# Patient Record
Sex: MALE | Race: WHITE
[De-identification: names, ages, dates, MRNs, and addresses within clinical notes are randomized per-mention and may not be internally consistent; named-entity substitution may affect disease eponyms.]

---

## 2018-08-19 ENCOUNTER — HOSPITAL ENCOUNTER (EMERGENCY)
Dept: HOSPITAL 25 - UCEAST | Age: 53
Discharge: HOME | End: 2018-08-19
Payer: COMMERCIAL

## 2018-08-19 VITALS — DIASTOLIC BLOOD PRESSURE: 82 MMHG | SYSTOLIC BLOOD PRESSURE: 128 MMHG

## 2018-08-19 DIAGNOSIS — R06.9: Primary | ICD-10-CM

## 2018-08-19 PROCEDURE — 99212 OFFICE O/P EST SF 10 MIN: CPT

## 2018-08-19 PROCEDURE — 87651 STREP A DNA AMP PROBE: CPT

## 2018-08-19 PROCEDURE — G0463 HOSPITAL OUTPT CLINIC VISIT: HCPCS

## 2018-08-19 NOTE — UC
Throat Pain/Nasal Castillo HPI





- HPI Summary


HPI Summary: 





53 y/o male presents to the urgent care c/o  sore throat, cough and chest 

congestion for the past 5 days. Pt reports nasal congestion w/ clear nasal 

discharge and a dry cough and +PND. Pain w/ swallowing is 8/10. Pt has taken 

Tylenol PO to alleviate symptoms. Pt denies fever, SOB, chest pain,abdominal 

pain, N/V/D.





- History of Current Complaint


Chief Complaint: UCRespiratory


Stated Complaint: SORE THROAT CONGESTION


Time Seen by Provider: 08/19/18 14:21


Hx Obtained From: Patient


Onset/Duration: Gradual Onset, Lasting Days - 5 days, Still Present, Worse 

Since - yesterday


Severity: Moderate


Pain Intensity: 8 - sore throat


Pain Scale Used: 0-10 Numeric


Cough: Nonproductive


Associated Signs & Symptoms: Positive: Dysphagia, Nasal Discharge - clear





- Epiglottits Risk Factors


Epiglottis Risk Factors: Negative





- Allergies/Home Medications


Allergies/Adverse Reactions: 


 Allergies











Allergy/AdvReac Type Severity Reaction Status Date / Time


 


No Known Allergies Allergy   Verified 08/19/18 13:54














PMH/Surg Hx/FS Hx/Imm Hx


Previously Healthy: Yes - Pt denies PMHX





- Surgical History


Surgical History: None





- Family History


Known Family History: Positive: Cardiac Disease, Diabetes





- Social History


Occupation: Employed Full-time


Lives: With Family


Alcohol Use: Rare


Substance Use Type: None


Smoking Status (MU): Never Smoked Tobacco





Review of Systems


Constitutional: Negative


Skin: Negative


Eyes: Negative


ENT: Sore Throat, Nasal Discharge, Sinus Congestion


Respiratory: Cough - dry


Cardiovascular: Negative


Gastrointestinal: Negative


Genitourinary: Negative


Motor: Negative


Neurovascular: Negative


Musculoskeletal: Negative


Neurological: Negative


Psychological: Negative


Is Patient Immunocompromised?: No


All Other Systems Reviewed And Are Negative: Yes





Physical Exam





- Summary


Physical Exam Summary: 





VITAL SIGNS: Reviewed.


GENERAL: Patient is a well developed and nourished male who is sitting 

comfortable in the examining table. Patient is not in any acute respiratory 

distress.


HEAD AND FACE: No signs of trauma. No ecchymosis, hematomas or skull 

depressions. No sinus tenderness.


EYES: PERRLA, EOMI x 2, No injected conjunctiva, no nystagmus. No photophobia.


EARS: Hearing grossly intact. Ear canals and tympanic membranes are within 

normal limits.


Nose: edematous and erythematous nasal mucosa w/ clear nasal discharge.


MOUTH: Positive no erythema, no tonsillar enlargement. Uvula in midline.


NECK: Supple, trachea is midline, Positive anterior cervical lymphadenopathy, 

no JVD, no carotid bruit, no c-spine tenderness, neck with full ROM. No 

meningeal signs, no Kernig's or brudzinskis signs.


CHEST: Symmetric, no tenderness at palpation


LUNGS: Clear to auscultation bilaterally. No wheezing or crackles.


CVS: Regular rate and rhythm, S1 and S2 present, no murmurs or gallops 

appreciated.


ABDOMEN: Soft, non-tender. No signs of distention. No rebound no guarding, and 

no masses palpated. Bowel sounds are normal.


EXTREMITIES: FROM in all major joints, no edema, no cyanosis or clubbing.


NEURO: Alert and oriented x 3. No acute neurological deficits. Speech is normal 

and follows commands.


SKIN: Dry and warm





Triage Information Reviewed: Yes


Vital Signs: 


 Initial Vital Signs











Temp  99.4 F   08/19/18 13:52


 


Pulse  94   08/19/18 13:52


 


Resp  12   08/19/18 13:52


 


BP  128/82   08/19/18 13:52


 


Pulse Ox  98   08/19/18 13:52














Throat Pain/Nasal Course/Dx





- Course


Course Of Treatment: 53 y/o male presents to the urgent care c/o  sore throat, 

cough and chest congestion for the past 5 days. Pt reports nasal congestion w/ 

clear nasal discharge and a dry cough and +PND. Pain w/ swallowing is 8/10. Pt 

has taken Tylenol PO to alleviate symptoms. Pt denies fever, SOB, chest pain,

abdominal pain, N/V/D.Hx obtained. Pt w/ UIR on examination.RApid strep 

negative. Pt advised to increase fluid intake, rest and eat well, Rx  Tessalon 

PO to alleviate symptoms. Advised to take Ibuprofen PO to alleviate symptoms. 

Pt understood and agreed with D/C instructions.





- Differential Dx/Diagnosis


Differential Diagnosis/HQI/PQRI: Influenza, Laryngitis, Pharyngitis, Sinusitis, 

Tonsillitis, URI


Provider Diagnoses: 1- Upper respiratory infection





Discharge





- Sign-Out/Discharge


Documenting (check all that apply): Patient Departure - D/C home


All imaging exams completed and their final reports reviewed: Yes





- Discharge Plan


Condition: Stable


Disposition: HOME


Prescriptions: 


Benzonatate CAP* [Tessalon 100 MG CAP*] 100 mg PO TID PRN #21 cap


 PRN Reason: Cough


Ibuprofen TAB* [Motrin TAB* 600 MG] 600 mg PO Q6H PRN #30 tab


 PRN Reason: Sore Throat


Patient Education Materials:  Upper Respiratory Infection (ED)


Referrals: 


Larry Sims MD [Primary Care Provider] - 3 Days


Additional Instructions: 


1- Please take Tessalon tabs PO as directed to alleviate cough.


2-Please take ibuprofen PO q6-8hrs prn as instructed after meals to alleviate 

pain and swelling. Increase fluid intake, eat well, rest and avoid strenuous 

exercise


3-If symptoms do not improve or worsen please return to the urgent care or f/u 

with your PCP in 3 days for further evaluation and treatment.











- Billing Disposition and Condition


Condition: STABLE


Disposition: Home